# Patient Record
Sex: FEMALE | Race: AMERICAN INDIAN OR ALASKA NATIVE
[De-identification: names, ages, dates, MRNs, and addresses within clinical notes are randomized per-mention and may not be internally consistent; named-entity substitution may affect disease eponyms.]

---

## 2019-03-10 ENCOUNTER — HOSPITAL ENCOUNTER (EMERGENCY)
Dept: HOSPITAL 5 - ED | Age: 24
Discharge: HOME | End: 2019-03-10
Payer: COMMERCIAL

## 2019-03-10 VITALS — SYSTOLIC BLOOD PRESSURE: 115 MMHG | DIASTOLIC BLOOD PRESSURE: 49 MMHG

## 2019-03-10 DIAGNOSIS — R25.2: Primary | ICD-10-CM

## 2019-03-10 DIAGNOSIS — R10.30: ICD-10-CM

## 2019-03-10 DIAGNOSIS — R11.2: ICD-10-CM

## 2019-03-10 LAB
BACTERIA #/AREA URNS HPF: (no result) /HPF
BASOPHILS # (AUTO): 0 K/MM3 (ref 0–0.1)
BASOPHILS NFR BLD AUTO: 0.7 % (ref 0–1.8)
BILIRUB UR QL STRIP: (no result)
BLOOD UR QL VISUAL: (no result)
BUN SERPL-MCNC: 13 MG/DL (ref 7–17)
BUN/CREAT SERPL: 22 %
CALCIUM SERPL-MCNC: 8.9 MG/DL (ref 8.4–10.2)
EOSINOPHIL # BLD AUTO: 0 K/MM3 (ref 0–0.4)
EOSINOPHIL NFR BLD AUTO: 0.7 % (ref 0–4.3)
HCT VFR BLD CALC: 35.6 % (ref 30.3–42.9)
HEMOLYSIS INDEX: 8
HGB BLD-MCNC: 12 GM/DL (ref 10.1–14.3)
LYMPHOCYTES # BLD AUTO: 1.5 K/MM3 (ref 1.2–5.4)
LYMPHOCYTES NFR BLD AUTO: 26.8 % (ref 13.4–35)
MCHC RBC AUTO-ENTMCNC: 34 % (ref 30–34)
MCV RBC AUTO: 93 FL (ref 79–97)
MONOCYTES # (AUTO): 0.6 K/MM3 (ref 0–0.8)
MONOCYTES % (AUTO): 11.4 % (ref 0–7.3)
MUCOUS THREADS #/AREA URNS HPF: (no result) /HPF
PH UR STRIP: 7 [PH] (ref 5–7)
PLATELET # BLD: 246 K/MM3 (ref 140–440)
PROT UR STRIP-MCNC: (no result) MG/DL
RBC # BLD AUTO: 3.84 M/MM3 (ref 3.65–5.03)
RBC #/AREA URNS HPF: 7 /HPF (ref 0–6)
UROBILINOGEN UR-MCNC: < 2 MG/DL (ref ?–2)
WBC #/AREA URNS HPF: 1 /HPF (ref 0–6)

## 2019-03-10 PROCEDURE — 36415 COLL VENOUS BLD VENIPUNCTURE: CPT

## 2019-03-10 PROCEDURE — 99283 EMERGENCY DEPT VISIT LOW MDM: CPT

## 2019-03-10 PROCEDURE — 80048 BASIC METABOLIC PNL TOTAL CA: CPT

## 2019-03-10 PROCEDURE — 81001 URINALYSIS AUTO W/SCOPE: CPT

## 2019-03-10 PROCEDURE — 85025 COMPLETE CBC W/AUTO DIFF WBC: CPT

## 2019-03-10 PROCEDURE — 81025 URINE PREGNANCY TEST: CPT

## 2019-03-10 NOTE — EMERGENCY DEPARTMENT REPORT
ED Abdominal Pain HPI





- General


Chief Complaint: Abdominal Pain


Stated Complaint: ABD PAIN


Time Seen by Provider: 03/10/19 14:49


Source: patient


Mode of arrival: Ambulatory


Limitations: No Limitations





- History of Present Illness


Initial Comments: 





Patient is a 23-year-old female that presents emergency room with lower 

abdominal pain 2 days.  Patient states this pain is consistent with her 

menstrual cramps.  She describes it as a cramping sensation in her lower abdomen

that started 2 days ago when her period started.  Patient is currently on her 

cycle.  Patient is on day 2 over cycle.  Patient states her cycle is irregular. 

Patient states she is having nausea and vomiting secondary to the pain.  Patient

states at this time the pain is a 6 out of 10.


MD Complaint: abdominal pain


-: Sudden


Location: suprapubic


Radiation: none


Migration to: no migration


Severity: severe


Severity scale (0 -10): 6


Quality: cramping


Consistency: constant


Improves With: rest


Worsens With: eating, vomiting, movement


Associated Symptoms: nausea, vomiting.  denies: diarrhea, fever, chills, 

constipation, dysuria, hematemesis, hematochezia, melena, hematuria





- Related Data


                                  Previous Rx's











 Medication  Instructions  Recorded  Last Taken  Type


 


Ibuprofen 800 mg PO Q8HR PRN #30 tablet 03/10/19 Unknown Rx


 


Ondansetron [Zofran Odt] 4 mg PO Q6HR PRN #15 tab.rapdis 03/10/19 Unknown Rx











                                    Allergies











Allergy/AdvReac Type Severity Reaction Status Date / Time


 


No Known Allergies Allergy   Unverified 03/10/19 13:16














ED Review of Systems


ROS: 


Stated complaint: ABD PAIN


Other details as noted in HPI





Constitutional: denies: chills, fever


Eyes: denies: eye pain, eye discharge, vision change


ENT: denies: ear pain, throat pain


Respiratory: denies: cough, shortness of breath, wheezing


Cardiovascular: denies: chest pain, palpitations


Endocrine: no symptoms reported


Gastrointestinal: abdominal pain, nausea, vomiting.  denies: diarrhea


Genitourinary: denies: urgency, dysuria, discharge


Musculoskeletal: denies: back pain, joint swelling, arthralgia


Skin: denies: rash, lesions


Neurological: denies: headache, weakness, paresthesias


Psychiatric: denies: anxiety, depression


Hematological/Lymphatic: denies: easy bleeding, easy bruising





ED Past Medical Hx





- Past Medical History


Previous Medical History?: Yes


Additional medical history: painful menses





- Surgical History


Past Surgical History?: No





- Family History


Family history: no significant





- Social History


Smoking Status: Never Smoker


Substance Use Type: None





- Medications


Home Medications: 


                                Home Medications











 Medication  Instructions  Recorded  Confirmed  Last Taken  Type


 


Ibuprofen 800 mg PO Q8HR PRN #30 tablet 03/10/19  Unknown Rx


 


Ondansetron [Zofran Odt] 4 mg PO Q6HR PRN #15 tab.rapdis 03/10/19  Unknown Rx














ED Physical Exam





- General


Limitations: No Limitations


General appearance: alert, in no apparent distress





- Head


Head exam: Present: atraumatic, normocephalic





- Eye


Eye exam: Present: normal appearance





- ENT


ENT exam: Present: mucous membranes moist





- Neck


Neck exam: Present: normal inspection





- Respiratory


Respiratory exam: Present: normal lung sounds bilaterally.  Absent: respiratory 

distress





- Cardiovascular


Cardiovascular Exam: Present: regular rate, normal rhythm.  Absent: systolic 

murmur, diastolic murmur, rubs, gallop





- GI/Abdominal


GI/Abdominal exam: Present: soft, normal bowel sounds.  Absent: distended, 

tenderness, guarding





- Extremities Exam


Extremities exam: Present: normal inspection





- Back Exam


Back exam: Present: normal inspection





- Neurological Exam


Neurological exam: Present: alert, oriented X3





- Psychiatric


Psychiatric exam: Present: normal affect, normal mood





- Skin


Skin exam: Present: warm, dry, intact, normal color.  Absent: rash





ED Course


                                   Vital Signs











  03/10/19





  13:20


 


Temperature 98.7 F


 


Pulse Rate 80


 


Respiratory 16





Rate 


 


Blood Pressure 115/49


 


O2 Sat by Pulse 100





Oximetry 














- Reevaluation(s)


Reevaluation #1: 


Discussed all results with patient.  Discussed plan of care with patient.  

Patient agrees with plan of care and discharge.  Patient will be discharged home

with meds.  Patient given discharge instruction.  Patient voiced understanding 

of all instructions.


03/10/19 15:09








ED Medical Decision Making





- Lab Data


Result diagrams: 


                                 03/10/19 13:49





                                 03/10/19 13:49





- Medical Decision Making





Patient is a 23-year-old female that presents emergency room with menstrual 

cramps and lower abdominal pain.  Patient also has nausea and vomiting secondary

to the pain.  Patient's findings are consistent with menstrual cramps.  

Patient's labs are unremarkable.  Patient even discharge instructions.  Patient 

instructed to follow up with OB/GYN.  Patient was given 800 mg ibuprofen as well

as Zofran.





- Differential Diagnosis


menstrual cramps.  Abdominal pain.


Critical care attestation.: 


If time is entered above; I have spent that time in minutes in the direct care 

of this critically ill patient, excluding procedure time.








ED Disposition


Clinical Impression: 


 Menstrual cramps





Abdominal pain


Qualifiers:


 Abdominal location: lower abdomen, unspecified Qualified Code(s): R10.30 - 

Lower abdominal pain, unspecified





Nausea & vomiting


Qualifiers:


 Vomiting type: unspecified Vomiting Intractability: non-intractable Qualified 

Code(s): R11.2 - Nausea with vomiting, unspecified





Disposition: DC-01 TO HOME OR SELFCARE


Is pt being admited?: No


Does the pt Need Aspirin: No


Condition: Stable


Instructions:  Abdominal Pain (ED), Menstruation (ED), Dysmenorrhea (ED)


Additional Instructions: 


Patient to follow up with primary care in 2-3 days.  Patient to follow-up with 

GYN in 2-3 days.  Patient take meds as directed.  Patient to return to ER if 

condition worsens.  Patient increase water.  Patient to rest.


Prescriptions: 


Ibuprofen 800 mg PO Q8HR PRN #30 tablet


 PRN Reason: Pain , Severe (7-10)


Ondansetron [Zofran Odt] 4 mg PO Q6HR PRN #15 tab.rapdis


 PRN Reason: Nausea And Vomiting


Referrals: 


[facility] [Primary Care Provider[facility] - 2-3 Days


VISHAL GIFFORD MD [Staff Physician] - 2-3 Days


Time of Disposition: 15:14

## 2019-03-10 NOTE — EMERGENCY DEPARTMENT REPORT
Blank Doc





- Documentation


Documentation: 





This is a 23-year-old female that presents with pelvic cramping with nausea.  

Patient stated she just started her menstrual cycle yesterday.  Denies any 

vomiting.  Denies any radiation of pain.





This initial assessment/diagnostic orders/clinical plan/treatment(s) is/are 

subject to change based on patient's health status, clinical progression and re-

assessment by fellow clinical providers in the ED.  Further treatment and workup

at subsequent clinical providers discretion.  Patient/guardians urged not to 

elope from the ED as their condition may be serious if not clinically assessed 

and managed.  Initial orders include:


1- Patient sent to [facility] for further evaluation and treatment


2- UA


3- labs